# Patient Record
Sex: MALE | Race: OTHER | HISPANIC OR LATINO | Employment: UNEMPLOYED | URBAN - METROPOLITAN AREA
[De-identification: names, ages, dates, MRNs, and addresses within clinical notes are randomized per-mention and may not be internally consistent; named-entity substitution may affect disease eponyms.]

---

## 2021-01-21 ENCOUNTER — HOSPITAL ENCOUNTER (EMERGENCY)
Facility: HOSPITAL | Age: 41
Discharge: HOME | End: 2021-01-21
Attending: EMERGENCY MEDICINE | Admitting: EMERGENCY MEDICINE

## 2021-01-21 ENCOUNTER — APPOINTMENT (EMERGENCY)
Dept: RADIOLOGY | Facility: HOSPITAL | Age: 41
End: 2021-01-21

## 2021-01-21 VITALS
RESPIRATION RATE: 16 BRPM | SYSTOLIC BLOOD PRESSURE: 131 MMHG | OXYGEN SATURATION: 100 % | DIASTOLIC BLOOD PRESSURE: 66 MMHG | WEIGHT: 172.4 LBS | TEMPERATURE: 98 F | HEART RATE: 100 BPM
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
## 2021-01-21 DIAGNOSIS — M79.601 RIGHT ARM PAIN: ICD-10-CM

## 2021-01-21 PROCEDURE — 73030 X-RAY EXAM OF SHOULDER: CPT

## 2021-01-21 PROCEDURE — 99283 EMERGENCY DEPT VISIT LOW MDM: CPT

## 2021-01-21 PROCEDURE — 73110 X-RAY EXAM OF WRIST: CPT

## 2021-01-21 PROCEDURE — 99284 EMERGENCY DEPT VISIT MOD MDM: CPT | Performed by: EMERGENCY MEDICINE

## 2021-01-21 RX ORDER — TRAMADOL HYDROCHLORIDE 50 MG/1
50 TABLET ORAL ONCE
Status: COMPLETED | OUTPATIENT
Start: 2021-01-21 | End: 2021-01-21

## 2021-01-21 RX ORDER — TRAMADOL HYDROCHLORIDE 50 MG/1
50 TABLET ORAL EVERY 6 HOURS PRN
Qty: 10 TABLET | Refills: 0 | Status: SHIPPED | OUTPATIENT
Start: 2021-01-21 | End: 2021-01-31

## 2021-01-21 RX ADMIN — TRAMADOL HYDROCHLORIDE 50 MG: 50 TABLET, FILM COATED ORAL at 20:36

## 2021-01-21 NOTE — Clinical Note
Oneal Yesenia was seen and treated in our emergency department on 1/21/2021  Diagnosis:     Jamel    He may return on this date: 01/23/2021         If you have any questions or concerns, please don't hesitate to call        Renea Dowell MD    ______________________________           _______________          _______________  Hospital Representative                              Date                                Time

## 2021-01-22 NOTE — ED PROVIDER NOTES
History  Chief Complaint   Patient presents with    Arm Injury     pt fell and broke his fall with his hand, R hand / arm pain     Patient is a 32-year-old right-hand-dominant male who presents with acute onset of right shoulder and right wrist pain status post a slip and fall  Patient states he had a [address] trying to brace himself  Constant sharp pain worse with any attempted movement  Better with rest   Not taking medications for it  No numbness or tingling  Denies any head trauma loss conscious  None       History reviewed  No pertinent past medical history  History reviewed  No pertinent surgical history  History reviewed  No pertinent family history  I have reviewed and agree with the history as documented  E-Cigarette/Vaping     E-Cigarette/Vaping Substances    Nicotine No     THC No     CBD No     Flavoring No     Other No     Unknown No      Social History     Tobacco Use    Smoking status: Never Smoker    Smokeless tobacco: Current User   Substance Use Topics    Alcohol use: Yes     Frequency: 2-4 times a month     Drinks per session: 5 or 6    Drug use: Not Currently       Review of Systems   Constitutional: Negative  HENT: Negative  Eyes: Negative  Respiratory: Negative  Cardiovascular: Negative  Gastrointestinal: Negative  Endocrine: Negative  Genitourinary: Negative  Musculoskeletal: Positive for arthralgias and myalgias  Skin: Negative  Allergic/Immunologic: Negative  Neurological: Negative  Negative for weakness and numbness  Hematological: Negative  Psychiatric/Behavioral: Negative  All other systems reviewed and are negative  Physical Exam  Physical Exam  Vitals signs and nursing note reviewed  Constitutional:       Appearance: Normal appearance  He is normal weight  HENT:      Head: Normocephalic and atraumatic        Nose: Nose normal       Mouth/Throat:      Mouth: Mucous membranes are moist       Pharynx: Oropharynx is clear  Neck:      Musculoskeletal: Normal range of motion and neck supple  Cardiovascular:      Rate and Rhythm: Normal rate and regular rhythm  Pulses: Normal pulses  Heart sounds: Normal heart sounds  Pulmonary:      Effort: Pulmonary effort is normal       Breath sounds: Normal breath sounds  Abdominal:      General: Bowel sounds are normal       Palpations: Abdomen is soft  Musculoskeletal:         General: Tenderness present  Comments: Patient with tenderness to the lateral aspect of the right shoulder  No deformity palpated no sunken miss  No clavicular tenderness to palpation  No elbow pain  No snuffbox tenderness palpation no wrist pain patient now complaining of tenderness at the right 2nd and 3rd MCP  Skin:     General: Skin is warm and dry  Capillary Refill: Capillary refill takes less than 2 seconds  Comments: No skin breakdown lesion   Neurological:      General: No focal deficit present  Mental Status: He is alert and oriented to person, place, and time  Cranial Nerves: No cranial nerve deficit  Sensory: No sensory deficit     Psychiatric:         Mood and Affect: Mood normal          Behavior: Behavior normal          Vital Signs  ED Triage Vitals   Temperature Pulse Respirations Blood Pressure SpO2   01/21/21 2021 01/21/21 2021 01/21/21 2021 01/21/21 2038 01/21/21 2021   98 °F (36 7 °C) 100 16 131/66 100 %      Temp Source Heart Rate Source Patient Position - Orthostatic VS BP Location FiO2 (%)   01/21/21 2021 01/21/21 2021 01/21/21 2021 01/21/21 2021 --   Tympanic Monitor Sitting Left arm       Pain Score       01/21/21 2021       8           Vitals:    01/21/21 2021 01/21/21 2038   BP:  131/66   Pulse: 100    Patient Position - Orthostatic VS: Sitting          Visual Acuity      ED Medications  Medications   traMADol (ULTRAM) tablet 50 mg (50 mg Oral Given 1/21/21 2036)       Diagnostic Studies  Results Reviewed     None XR shoulder 2+ views RIGHT   ED Interpretation by Caitlyn Rodriguez MD (01/21 2050)   No fx        XR wrist 3+ views RIGHT   ED Interpretation by Caitlyn Rodriguez MD (01/21 2050)   No fx                   Procedures  Procedures         ED Course                             SBIRT 22yo+      Most Recent Value   SBIRT (23 yo +)   In order to provide better care to our patients, we are screening all of our patients for alcohol and drug use  Would it be okay to ask you these screening questions? No Filed at: 01/21/2021 2032                    MDM  Number of Diagnoses or Management Options  Fall, initial encounter:   Right arm pain:      Amount and/or Complexity of Data Reviewed  Tests in the radiology section of CPT®: reviewed and ordered  Independent visualization of images, tracings, or specimens: yes        Disposition  Final diagnoses:   Fall, initial encounter   Right arm pain     Time reflects when diagnosis was documented in both MDM as applicable and the Disposition within this note     Time User Action Codes Description Comment    1/21/2021  8:54 PM Fe Mast Add Ozzy Class  JMNY] Fall, initial encounter     1/21/2021  8:54 PM Fe Mast Add [C50 098] Right arm pain       ED Disposition     ED Disposition Condition Date/Time Comment    Discharge Stable u Jan 21, 2021  8:54  [place] [place] discharge to home/self care              Follow-up Information     Follow up With Specialties Details Why Contact Info Additional [address]   [address], [address] [place] [address] Drive [id number]  [address] [place] [address] [address], [address] [place] [address], [address]          Patient's Medications   Discharge Prescriptions    TRAMADOL (ULTRAM) 50 MG TABLET    Take 1 tablet (50 mg total) by mouth every 6 (six) hours as needed for moderate pain for up to 10 days       Start Date: 1/21/2021 End Date: 1/31/2021       Order Dose: 50 mg       Quantity: 10 tablet    Refills: 0     No discharge procedures on file      PDMP Review     None          ED Provider  Electronically Signed by           Melisa Campos MD  01/21/21 2056